# Patient Record
Sex: MALE | Race: WHITE | NOT HISPANIC OR LATINO
[De-identification: names, ages, dates, MRNs, and addresses within clinical notes are randomized per-mention and may not be internally consistent; named-entity substitution may affect disease eponyms.]

---

## 2023-06-23 PROBLEM — Z00.00 ENCOUNTER FOR PREVENTIVE HEALTH EXAMINATION: Status: ACTIVE | Noted: 2023-06-23

## 2023-06-30 ENCOUNTER — NON-APPOINTMENT (OUTPATIENT)
Age: 73
End: 2023-06-30

## 2023-06-30 ENCOUNTER — APPOINTMENT (OUTPATIENT)
Dept: CARDIOLOGY | Facility: CLINIC | Age: 73
End: 2023-06-30
Payer: MEDICARE

## 2023-06-30 VITALS
DIASTOLIC BLOOD PRESSURE: 70 MMHG | HEIGHT: 68 IN | SYSTOLIC BLOOD PRESSURE: 114 MMHG | BODY MASS INDEX: 27.89 KG/M2 | OXYGEN SATURATION: 99 % | HEART RATE: 57 BPM | WEIGHT: 184 LBS

## 2023-06-30 VITALS — SYSTOLIC BLOOD PRESSURE: 102 MMHG | DIASTOLIC BLOOD PRESSURE: 72 MMHG

## 2023-06-30 DIAGNOSIS — I49.3 VENTRICULAR PREMATURE DEPOLARIZATION: ICD-10-CM

## 2023-06-30 DIAGNOSIS — R94.31 ABNORMAL ELECTROCARDIOGRAM [ECG] [EKG]: ICD-10-CM

## 2023-06-30 DIAGNOSIS — F17.290 NICOTINE DEPENDENCE, OTHER TOBACCO PRODUCT, UNCOMPLICATED: ICD-10-CM

## 2023-06-30 DIAGNOSIS — Z78.9 OTHER SPECIFIED HEALTH STATUS: ICD-10-CM

## 2023-06-30 PROCEDURE — 99406 BEHAV CHNG SMOKING 3-10 MIN: CPT

## 2023-06-30 PROCEDURE — 93000 ELECTROCARDIOGRAM COMPLETE: CPT

## 2023-06-30 PROCEDURE — 99205 OFFICE O/P NEW HI 60 MIN: CPT | Mod: 25

## 2023-06-30 NOTE — ASSESSMENT
[FreeTextEntry1] : Reviewed June 30, 2023.  EKG as noted above.\par EKG from outside office also was reviewed.  From June 8, 2023.  Scanned in the chart.\par Labs had shown elevated triglycerides.  I do not have the full profile available for me to review.

## 2023-06-30 NOTE — CARDIOLOGY SUMMARY
[de-identified] : June 30, 2023.  Normal sinus rhythm PVCs right bundle branch block left anterior hemiblock bifascicular block

## 2023-06-30 NOTE — PHYSICAL EXAM
[Well Developed] : well developed [Well Nourished] : well nourished [No Acute Distress] : no acute distress [Normal Venous Pressure] : normal venous pressure [No Carotid Bruit] : no carotid bruit [Normal S1, S2] : normal S1, S2 [No Murmur] : no murmur [No Rub] : no rub [No Gallop] : no gallop [Clear Lung Fields] : clear lung fields [Good Air Entry] : good air entry [No Respiratory Distress] : no respiratory distress  [Soft] : abdomen soft [Non Tender] : non-tender [Normal Gait] : normal gait [No Edema] : no edema [No Cyanosis] : no cyanosis [No Clubbing] : no clubbing [No Varicosities] : no varicosities [Normal Radial B/L] : normal radial B/L [Normal DP B/L] : normal DP B/L [Moves all extremities] : moves all extremities [Normal Speech] : normal speech [Alert and Oriented] : alert and oriented [de-identified] : Decreased upstroke [de-identified] : No bruit

## 2023-06-30 NOTE — REASON FOR VISIT
[Symptom and Test Evaluation] : symptom and test evaluation [Arrhythmia/ECG Abnorrmalities] : arrhythmia/ECG abnormalities [FreeTextEntry3] : Dr. Sandra [FreeTextEntry1] : 73 years old white gentleman is referred to me for consultation in presence of abnormal EKG.\par He has no prior history of hypertension, diabetes mellitus, myocardial infarction, coronary artery disease, cerebrovascular accident, peripheral arterial disease, rheumatic fever, thyroid or Lyme disease.\par He is cigar smoker.\par He plays golf regularly.  He denies any palpitations.  No dizziness near syncopal or syncopal event.  He denies any chest pain.  His exertional dyspnea at moderate to high level of workload.  No PND orthopnea\par No claudication pain.  \par No hospital admission or evaluation from cardiac point of\par

## 2023-06-30 NOTE — DISCUSSION/SUMMARY
[FreeTextEntry1] : 73-year-old gentleman with above medical history active medical problems as noted below\par 1.  PVCs bifascicular block.  Pathophysiology reviewed.  Possibility of subclinical cardiovascular disease needs to be ruled out in presence of PVCs and conduction system abnormality with CAD risk factors in the form of age, gender, extensive history of cigar smoking.\par At present recommended\par Echocardiogram for LV ejection fraction wall motion\par Stress myocardial perfusion scan to assess evaluate and rule out any significant obstructive CAD, exercise related blood pressure heart rate change and rule out any exercise-induced arrhythmias.\par Event monitor\par Risk benefits alternatives of above evaluation reviewed.\par Technetium 99 radioisotope use was reviewed.  Risks, benefits, alternatives of use of radioisotope was discussed at length.  Patient verbalized understanding and agreed with the management plan.\par Based on about test we will discuss further regarding whether he would benefit from further evaluation with coronary calcium score or coronary CTA also whether to consider statin therapy/aspirin.\par I have also discussed progressive worsening of conduction system can lead to need for device management.\par High risk symptoms to notify us which includes chest pain, exertional dyspnea, dizziness near syncopal or syncopal event he will call 911 and go to nearest emergency room.\par 2.  Extensive history of cigarette smoking.  Age gender.  Dyslipidemia.  Recommended carotid Doppler study in presence of decreased carotid upstroke to rule out subclinical carotid atherosclerotic vascular disease.\par Abdominal aortic ultrasound to rule out abdominal aortic aneurysm.\par 3.  Dyslipidemia.  Elevated triglycerides.  Obtain full lipid panel along with CMP.  Based on above evaluation will discuss further whether he would benefit from statin therapy for primary prevention of ASCVD related events and associated morbidity mortality.\par   4 cigar smoking.  Smoking cessation. Counseling done. Relationship with ASCVD, and associated morbidity, mortality was reviewed. Options available discussed.\par \par Counseling regarding low saturated fat, salt and carbohydrate intake was reviewed. Active lifestyle and regular. Exercise along with weight management is advised.\par All the above were at length reviewed. Answered all the questions. Thank you very much for this kind referral. Please do not hesitate to give me a call for any question.\par Part of this transcription was done with voice recognition software and phonetically similar errors are common. I apologize for that. Please do not hesitate to call for any questions due to above.\par \par Sincerely,\par Sundar Thompson MD,FACC,FASE\par

## 2023-07-07 ENCOUNTER — APPOINTMENT (OUTPATIENT)
Dept: UROLOGY | Facility: CLINIC | Age: 73
End: 2023-07-07
Payer: MEDICARE

## 2023-07-07 ENCOUNTER — NON-APPOINTMENT (OUTPATIENT)
Age: 73
End: 2023-07-07

## 2023-07-07 VITALS
SYSTOLIC BLOOD PRESSURE: 107 MMHG | DIASTOLIC BLOOD PRESSURE: 63 MMHG | WEIGHT: 186 LBS | HEART RATE: 61 BPM | HEIGHT: 68 IN | BODY MASS INDEX: 28.19 KG/M2 | TEMPERATURE: 97.3 F

## 2023-07-07 DIAGNOSIS — R97.20 ELEVATED PROSTATE, SPECIFIC ANTIGEN [PSA]: ICD-10-CM

## 2023-07-07 PROCEDURE — 99203 OFFICE O/P NEW LOW 30 MIN: CPT

## 2023-07-07 NOTE — PHYSICAL EXAM
[General Appearance - Well Developed] : well developed [General Appearance - Well Nourished] : well nourished [Normal Appearance] : normal appearance [Well Groomed] : well groomed [General Appearance - In No Acute Distress] : no acute distress [Edema] : no peripheral edema [Respiration, Rhythm And Depth] : normal respiratory rhythm and effort [Exaggerated Use Of Accessory Muscles For Inspiration] : no accessory muscle use [Abdomen Soft] : soft [Abdomen Tenderness] : non-tender [Costovertebral Angle Tenderness] : no ~M costovertebral angle tenderness [Urethral Meatus] : meatus normal [Penis Abnormality] : normal circumcised penis [Urinary Bladder Findings] : the bladder was normal on palpation [Scrotum] : the scrotum was normal [Epididymis] : the epididymides were normal [Testes Tenderness] : no tenderness of the testes [Testes Mass (___cm)] : there were no testicular masses [Prostate Enlargement] : the prostate was not enlarged [Prostate Tenderness] : the prostate was not tender [No Prostate Nodules] : no prostate nodules [Prostate Size ___ gm] : prostate size [unfilled] gm [Normal Station and Gait] : the gait and station were normal for the patient's age [] : no rash [No Focal Deficits] : no focal deficits [Oriented To Time, Place, And Person] : oriented to person, place, and time [Affect] : the affect was normal [Mood] : the mood was normal [Not Anxious] : not anxious

## 2023-07-07 NOTE — LETTER BODY
[Dear  ___] : Dear  [unfilled], [Consult Letter:] : I had the pleasure of evaluating your patient, [unfilled]. [Please see my note below.] : Please see my note below. [Consult Closing:] : Thank you very much for allowing me to participate in the care of this patient.  If you have any questions, please do not hesitate to contact me. [Sincerely,] : Sincerely, [FreeTextEntry3] : Herson Acosta, DO\par Genitourinary Medicine\par

## 2023-07-07 NOTE — HISTORY OF PRESENT ILLNESS
[FreeTextEntry1] : Mr. JAC CAT 73 year old  M no  PMH and no PSH. Pt comes in for elevated PSA. Pt urinating well. Erections are ok. Cigar smoker.\par \par 6/8/23 PSA 6.15

## 2023-07-10 ENCOUNTER — APPOINTMENT (OUTPATIENT)
Dept: CARDIOLOGY | Facility: CLINIC | Age: 73
End: 2023-07-10
Payer: MEDICARE

## 2023-07-10 LAB
APPEARANCE: CLEAR
BACTERIA UR CULT: NORMAL
BACTERIA: NEGATIVE /HPF
BILIRUBIN URINE: NEGATIVE
BLOOD URINE: NEGATIVE
CAST: 0 /LPF
COLOR: YELLOW
EPITHELIAL CELLS: 0 /HPF
GLUCOSE QUALITATIVE U: NEGATIVE MG/DL
KETONES URINE: NEGATIVE MG/DL
LEUKOCYTE ESTERASE URINE: NEGATIVE
MICROSCOPIC-UA: NORMAL
NITRITE URINE: NEGATIVE
PH URINE: 5.5
PROTEIN URINE: NEGATIVE MG/DL
RED BLOOD CELLS URINE: 0 /HPF
SPECIFIC GRAVITY URINE: 1.02
UROBILINOGEN URINE: 0.2 MG/DL
WHITE BLOOD CELLS URINE: 0 /HPF

## 2023-07-10 PROCEDURE — 93242 EXT ECG>48HR<7D RECORDING: CPT

## 2023-07-20 ENCOUNTER — NON-APPOINTMENT (OUTPATIENT)
Age: 73
End: 2023-07-20

## 2023-07-21 ENCOUNTER — NON-APPOINTMENT (OUTPATIENT)
Age: 73
End: 2023-07-21

## 2023-08-10 ENCOUNTER — APPOINTMENT (OUTPATIENT)
Dept: CARDIOLOGY | Facility: CLINIC | Age: 73
End: 2023-08-10
Payer: MEDICARE

## 2023-08-10 PROCEDURE — 93979 VASCULAR STUDY: CPT

## 2023-08-10 PROCEDURE — 93306 TTE W/DOPPLER COMPLETE: CPT

## 2023-08-10 PROCEDURE — 93880 EXTRACRANIAL BILAT STUDY: CPT

## 2023-08-18 ENCOUNTER — APPOINTMENT (OUTPATIENT)
Dept: CARDIOLOGY | Facility: CLINIC | Age: 73
End: 2023-08-18

## 2023-08-29 ENCOUNTER — APPOINTMENT (OUTPATIENT)
Dept: CARDIOLOGY | Facility: CLINIC | Age: 73
End: 2023-08-29
Payer: MEDICARE

## 2023-08-29 PROCEDURE — 93015 CV STRESS TEST SUPVJ I&R: CPT

## 2023-08-29 PROCEDURE — A9502: CPT

## 2023-08-29 PROCEDURE — 78452 HT MUSCLE IMAGE SPECT MULT: CPT

## 2023-09-10 NOTE — REASON FOR VISIT
[Symptom and Test Evaluation] : symptom and test evaluation [FreeTextEntry3] : Dr. Sandra [Arrhythmia/ECG Abnorrmalities] : arrhythmia/ECG abnormalities [FreeTextEntry1] : 73 years old white gentleman is referred to me for consultation in presence of abnormal EKG.\par  He has no prior history of hypertension, diabetes mellitus, myocardial infarction, coronary artery disease, cerebrovascular accident, peripheral arterial disease, rheumatic fever, thyroid or Lyme disease.\par  He is cigar smoker.\par  He plays golf regularly.  He denies any palpitations.  No dizziness near syncopal or syncopal event.  He denies any chest pain.  His exertional dyspnea at moderate to high level of workload.  No PND orthopnea\par  No claudication pain.  \par  No hospital admission or evaluation from cardiac point of\par

## 2023-09-10 NOTE — CARDIOLOGY SUMMARY
[de-identified] : June 30, 2023.  Normal sinus rhythm PVCs right bundle branch block left anterior hemiblock bifascicular block

## 2023-09-10 NOTE — PHYSICAL EXAM
[Well Developed] : well developed [Well Nourished] : well nourished [No Acute Distress] : no acute distress [No Carotid Bruit] : no carotid bruit [Normal Venous Pressure] : normal venous pressure [Normal S1, S2] : normal S1, S2 [No Murmur] : no murmur [No Rub] : no rub [No Gallop] : no gallop [Clear Lung Fields] : clear lung fields [Good Air Entry] : good air entry [No Respiratory Distress] : no respiratory distress  [Soft] : abdomen soft [Non Tender] : non-tender [Normal Gait] : normal gait [No Cyanosis] : no cyanosis [No Edema] : no edema [No Clubbing] : no clubbing [No Varicosities] : no varicosities [Normal Radial B/L] : normal radial B/L [Normal DP B/L] : normal DP B/L [Normal Speech] : normal speech [Moves all extremities] : moves all extremities [Alert and Oriented] : alert and oriented [de-identified] : Decreased upstroke [de-identified] : No bruit

## 2023-09-11 ENCOUNTER — APPOINTMENT (OUTPATIENT)
Dept: CARDIOLOGY | Facility: CLINIC | Age: 73
End: 2023-09-11
Payer: MEDICARE

## 2023-09-11 VITALS
BODY MASS INDEX: 27.89 KG/M2 | HEART RATE: 54 BPM | HEIGHT: 68 IN | SYSTOLIC BLOOD PRESSURE: 124 MMHG | WEIGHT: 184 LBS | DIASTOLIC BLOOD PRESSURE: 70 MMHG | OXYGEN SATURATION: 100 %

## 2023-09-11 DIAGNOSIS — I36.1 NONRHEUMATIC TRICUSPID (VALVE) INSUFFICIENCY: ICD-10-CM

## 2023-09-11 DIAGNOSIS — I45.10 UNSPECIFIED RIGHT BUNDLE-BRANCH BLOCK: ICD-10-CM

## 2023-09-11 DIAGNOSIS — I34.0 NONRHEUMATIC MITRAL (VALVE) INSUFFICIENCY: ICD-10-CM

## 2023-09-11 DIAGNOSIS — I47.29 OTHER VENTRICULAR TACHYCARDIA: ICD-10-CM

## 2023-09-11 DIAGNOSIS — E78.5 HYPERLIPIDEMIA, UNSPECIFIED: ICD-10-CM

## 2023-09-11 PROCEDURE — 99215 OFFICE O/P EST HI 40 MIN: CPT

## 2023-09-11 PROCEDURE — 99406 BEHAV CHNG SMOKING 3-10 MIN: CPT

## 2024-01-04 ENCOUNTER — APPOINTMENT (OUTPATIENT)
Dept: CARDIOLOGY | Facility: CLINIC | Age: 74
End: 2024-01-04

## 2024-05-13 ENCOUNTER — APPOINTMENT (OUTPATIENT)
Dept: CARDIOLOGY | Facility: CLINIC | Age: 74
End: 2024-05-13

## 2024-05-13 NOTE — CARDIOLOGY SUMMARY
[de-identified] : June 30, 2023.  Normal sinus rhythm PVCs right bundle branch block left anterior hemiblock bifascicular block [de-identified] :  7/10/2023..  Overall normal  Event monitor.  Next rhythm.  PACs.  PVC burden 1.3.  NSVT.  4-6 beats. [de-identified] : Nuclear myocardial perfusion scan.  9 minutes 32 seconds.  11 METS.  No chest pain.  Mild to moderate basal inferior inferoseptal fixed defect suggestive of diaphragmatic attenuation in presence of correction with prone imaging.  LV ejection fraction 59%. [de-identified] : August 10, 2023 EF 70% LVH.  Moderate TR.  Borderline pulmonary artery systolic pressure mild to moderate aortic regurgitation mild mitral regurgitation borderline [de-identified] : August 10, 2023.  Bilateral mild nonobstructive carotid disease August 10, 2023 abdominal aortic ultrasound no aneurysm.  Mild atherosclerosis.

## 2024-05-13 NOTE — REASON FOR VISIT
[FreeTextEntry3] : Dr. Sandra [FreeTextEntry1] : 73 years old white gentleman is referred to me for consultation in presence of abnormal EKG. now he is here to review his multiple cardiovascular tests. He has no prior history of hypertension, diabetes mellitus, myocardial infarction, coronary artery disease, cerebrovascular accident, peripheral arterial disease, rheumatic fever, thyroid or Lyme disease. He is cigar smoker. He plays golf regularly.  He denies any palpitations.  No dizziness near syncopal or syncopal event.  He denies any chest pain.  His exertional dyspnea at moderate to high level of workload.  No PND orthopnea No claudication pain.   No hospital admission or evaluation from cardiac point of

## 2024-05-13 NOTE — DISCUSSION/SUMMARY
[FreeTextEntry1] : 73-year-old gentleman with above medical history active medical problems as noted below 1.  PVCs bifascicular block.  NSVT.  Pathophysiology reviewed.  Preserved EF.  No syncope.  Nonischemic stress test.  Low probability of high risk arrhythmias leading to syncope or sudden cardiac death.  Lower heart rate at baseline.  Unable to use beta-blockers.  Any high risk symptoms she will contact us in that case further evaluation management with electrophysiology guided therapy and device management can be considered. Risk benefits alternatives of above evaluation reviewed. 2.  Aortic and carotid atherosclerosis. Atherosclerosis: From the available data evidence of atherosclerosis was reviewed.  We have discussed atherosclerosis is a generalized process.  The pathophysiology of the atherosclerosis  and associated morbidity mortality were reviewed.  Lifestyle modification and risk factors associated with atherosclerosis were discussed.  changes needed in lifestyle and risk factors were reviewed at length.  Understands prevention is the best way  in decreasing morbidity mortality and overall improving long-term prognosis. 3.  Dyslipidemia.  He has been aggressive with his lifestyle modifications and dietary restrictions.  He would like to have couple of months to recheck blood test.  If LDL cholesterol remains greater than 70 I have strongly recommended him to consider statin therapy specifically with evidence of ASCVD and high 10-year ASCVD risk of cardiovascular events and also to morbidity mortality.  He understands and agrees.   4 cigar smoking.  Smoking cessation. Counseling done. Relationship with ASCVD, and associated morbidity, mortality was reviewed. Options available discussed. 5.  Mild to moderate TR, MR.  Borderline pulmonary pressures.  Recheck it in 6-12 months.  If remains elevated consider further evaluation for TR and borderline pulmonary artery systolic pressure elevation.  Which may include pulmonary function test sleep apnea evaluate shunt.  Clinically he has no signs of sleep apnea.  Clinically no signs of left or right heart failure.  We will also check N-terminal proBNP   Counseling regarding low saturated fat, salt and carbohydrate intake was reviewed. Active lifestyle and regular. Exercise along with weight management is advised. All the above were at length reviewed. Answered all the questions. Thank you very much for this kind referral. Please do not hesitate to give me a call for any question. Part of this transcription was done with voice recognition software and phonetically similar errors are common. I apologize for that. Please do not hesitate to call for any questions due to above.  Sincerely, Sundar Thompson MD,FACC,SUDHEER

## 2024-05-13 NOTE — ASSESSMENT
[FreeTextEntry1] : Reviewed June 30, 2023.  EKG as noted above. EKG from outside office also was reviewed.  From June 8, 2023.  Scanned in the chart. Labs had shown elevated triglycerides.  I do not have the full profile available for me to review.  Reviewed on September 11, 2023 Labs had shown  HDL 60 total cholesterol 223 triglycerides 161.  ASCVD.  Risk greater than 7.5%. Echocardiogram/carotid Doppler study/abdominal aortic ultrasound/nuclear marker perfusion scan/event monitor were reviewed.

## 2024-08-09 ENCOUNTER — APPOINTMENT (OUTPATIENT)
Dept: UROLOGY | Facility: CLINIC | Age: 74
End: 2024-08-09

## 2024-08-09 PROCEDURE — 99213 OFFICE O/P EST LOW 20 MIN: CPT

## 2024-08-09 NOTE — HISTORY OF PRESENT ILLNESS
[FreeTextEntry1] : Mr. JAC CAT 73 year old  M no  PMH and no PSH. Pt comes in for elevated PSA. Pt urinating well. Erections are ok. Cigar smoker.  6/8/23 PSA 6.15 6/29/24 PSA 7.22

## 2024-08-20 ENCOUNTER — RESULT REVIEW (OUTPATIENT)
Age: 74
End: 2024-08-20

## 2024-08-20 ENCOUNTER — OUTPATIENT (OUTPATIENT)
Dept: OUTPATIENT SERVICES | Facility: HOSPITAL | Age: 74
LOS: 1 days | End: 2024-08-20
Payer: MEDICARE

## 2024-08-20 ENCOUNTER — APPOINTMENT (OUTPATIENT)
Dept: MRI IMAGING | Facility: CLINIC | Age: 74
End: 2024-08-20

## 2024-08-20 DIAGNOSIS — R97.20 ELEVATED PROSTATE SPECIFIC ANTIGEN [PSA]: ICD-10-CM

## 2024-08-20 PROCEDURE — 76498 UNLISTED MR PROCEDURE: CPT

## 2024-08-20 PROCEDURE — A9585: CPT

## 2024-08-20 PROCEDURE — 76498P: CUSTOM | Mod: 26

## 2024-08-20 PROCEDURE — 72197 MRI PELVIS W/O & W/DYE: CPT

## 2024-08-20 PROCEDURE — 72197 MRI PELVIS W/O & W/DYE: CPT | Mod: 26

## 2024-09-20 ENCOUNTER — APPOINTMENT (OUTPATIENT)
Dept: UROLOGY | Facility: CLINIC | Age: 74
End: 2024-09-20
Payer: MEDICARE

## 2024-09-20 DIAGNOSIS — R97.20 ELEVATED PROSTATE, SPECIFIC ANTIGEN [PSA]: ICD-10-CM

## 2024-09-20 PROCEDURE — 99213 OFFICE O/P EST LOW 20 MIN: CPT

## 2024-09-20 NOTE — HISTORY OF PRESENT ILLNESS
[FreeTextEntry1] : Verbal consent given on 09/20/2024 at 15:22 by JAC CAT Patient in [place] Provider in office  Mr. JAC CAT 73 year old  M no  PMH and no PSH. Pt comes in for elevated PSA. Pt urinating well. Erections are ok. Cigar smoker. Pt never got PSA free and total done yet.   6/8/23 PSA 6.15 6/29/24 PSA 7.22 8/20/24 MRI PIRADS 2 Prostate 144cc